# Patient Record
Sex: MALE | Race: OTHER | Employment: FULL TIME | URBAN - METROPOLITAN AREA
[De-identification: names, ages, dates, MRNs, and addresses within clinical notes are randomized per-mention and may not be internally consistent; named-entity substitution may affect disease eponyms.]

---

## 2021-04-27 ENCOUNTER — OFFICE VISIT (OUTPATIENT)
Dept: FAMILY MEDICINE CLINIC | Age: 31
End: 2021-04-27
Payer: MEDICAID

## 2021-04-27 VITALS
BODY MASS INDEX: 33.63 KG/M2 | HEIGHT: 64 IN | DIASTOLIC BLOOD PRESSURE: 58 MMHG | RESPIRATION RATE: 18 BRPM | WEIGHT: 197 LBS | OXYGEN SATURATION: 97 % | TEMPERATURE: 96.9 F | HEART RATE: 84 BPM | SYSTOLIC BLOOD PRESSURE: 118 MMHG

## 2021-04-27 DIAGNOSIS — Z83.3 FAMILY HISTORY OF DIABETES MELLITUS: ICD-10-CM

## 2021-04-27 DIAGNOSIS — Z11.4 SCREENING FOR HIV (HUMAN IMMUNODEFICIENCY VIRUS): ICD-10-CM

## 2021-04-27 DIAGNOSIS — Z13.1 SCREENING FOR DIABETES MELLITUS: ICD-10-CM

## 2021-04-27 DIAGNOSIS — Z13.220 SCREENING CHOLESTEROL LEVEL: ICD-10-CM

## 2021-04-27 DIAGNOSIS — H54.7 VISUAL IMPAIRMENT OF LEFT EYE: ICD-10-CM

## 2021-04-27 DIAGNOSIS — Z23 NEED FOR VACCINATION: ICD-10-CM

## 2021-04-27 DIAGNOSIS — Z00.01 ENCOUNTER FOR WELL ADULT EXAM WITH ABNORMAL FINDINGS: Primary | ICD-10-CM

## 2021-04-27 DIAGNOSIS — E66.9 CLASS 1 OBESITY WITHOUT SERIOUS COMORBIDITY WITH BODY MASS INDEX (BMI) OF 33.0 TO 33.9 IN ADULT, UNSPECIFIED OBESITY TYPE: ICD-10-CM

## 2021-04-27 DIAGNOSIS — Z11.59 NEED FOR HEPATITIS C SCREENING TEST: ICD-10-CM

## 2021-04-27 PROCEDURE — 90471 IMMUNIZATION ADMIN: CPT | Performed by: FAMILY MEDICINE

## 2021-04-27 PROCEDURE — 90715 TDAP VACCINE 7 YRS/> IM: CPT | Performed by: FAMILY MEDICINE

## 2021-04-27 PROCEDURE — 99385 PREV VISIT NEW AGE 18-39: CPT | Performed by: FAMILY MEDICINE

## 2021-04-27 SDOH — ECONOMIC STABILITY: TRANSPORTATION INSECURITY
IN THE PAST 12 MONTHS, HAS LACK OF TRANSPORTATION KEPT YOU FROM MEETINGS, WORK, OR FROM GETTING THINGS NEEDED FOR DAILY LIVING?: NO

## 2021-04-27 SDOH — ECONOMIC STABILITY: FOOD INSECURITY: WITHIN THE PAST 12 MONTHS, YOU WORRIED THAT YOUR FOOD WOULD RUN OUT BEFORE YOU GOT MONEY TO BUY MORE.: NEVER TRUE

## 2021-04-27 SDOH — HEALTH STABILITY: MENTAL HEALTH: HOW OFTEN DO YOU HAVE A DRINK CONTAINING ALCOHOL?: NEVER

## 2021-04-27 SDOH — ECONOMIC STABILITY: FOOD INSECURITY: WITHIN THE PAST 12 MONTHS, THE FOOD YOU BOUGHT JUST DIDN'T LAST AND YOU DIDN'T HAVE MONEY TO GET MORE.: NEVER TRUE

## 2021-04-27 ASSESSMENT — ENCOUNTER SYMPTOMS
COUGH: 0
EYE DISCHARGE: 0
ABDOMINAL PAIN: 0
ANAL BLEEDING: 0
DIARRHEA: 0
RHINORRHEA: 0
ABDOMINAL DISTENTION: 0
CHEST TIGHTNESS: 0
SHORTNESS OF BREATH: 0
COLOR CHANGE: 0
BLOOD IN STOOL: 0
SORE THROAT: 0
VOMITING: 0
NAUSEA: 0
CONSTIPATION: 0
WHEEZING: 0
APNEA: 0
SINUS PRESSURE: 0
CHOKING: 0
EYE PAIN: 0
PHOTOPHOBIA: 0

## 2021-04-27 ASSESSMENT — PATIENT HEALTH QUESTIONNAIRE - PHQ9
1. LITTLE INTEREST OR PLEASURE IN DOING THINGS: 0
SUM OF ALL RESPONSES TO PHQ QUESTIONS 1-9: 0
SUM OF ALL RESPONSES TO PHQ9 QUESTIONS 1 & 2: 0
SUM OF ALL RESPONSES TO PHQ QUESTIONS 1-9: 0

## 2021-04-27 NOTE — PROGRESS NOTES
oropharyngeal exudate. Eyes:      General: Lids are normal.         Right eye: No discharge. Left eye: No discharge. Conjunctiva/sclera: Conjunctivae normal.      Pupils:      Right eye: Pupil is round and reactive. Left eye: Pupil is not reactive. Pupil is round. Neck:      Musculoskeletal: Normal range of motion and neck supple. Thyroid: No thyromegaly. Vascular: No carotid bruit. Cardiovascular:      Rate and Rhythm: Normal rate and regular rhythm. Heart sounds: Normal heart sounds, S1 normal and S2 normal. No murmur. No friction rub. No gallop. Pulmonary:      Effort: Pulmonary effort is normal. No accessory muscle usage or respiratory distress. Breath sounds: Normal breath sounds. No wheezing, rhonchi or rales. Chest:      Chest wall: No tenderness. Abdominal:      General: Bowel sounds are normal. There is no distension. Palpations: Abdomen is soft. There is no mass. Tenderness: There is no abdominal tenderness. There is no right CVA tenderness, left CVA tenderness, guarding or rebound. Musculoskeletal: Normal range of motion. General: No tenderness or deformity. Lymphadenopathy:      Cervical: No cervical adenopathy. Upper Body:      Right upper body: No supraclavicular adenopathy. Left upper body: No supraclavicular adenopathy. Skin:     General: Skin is warm. Findings: No rash. Nails: There is no clubbing. Neurological:      Mental Status: He is alert and oriented to person, place, and time. Cranial Nerves: No cranial nerve deficit. Sensory: No sensory deficit. Motor: No abnormal muscle tone. Psychiatric:         Mood and Affect: Mood normal.         Behavior: Behavior normal.         Ortho Exam (If Applicable)              An electronic signature was used to authenticate this note.      Priscilla Sepulveda MD

## 2021-06-02 ENCOUNTER — OFFICE VISIT (OUTPATIENT)
Dept: FAMILY MEDICINE CLINIC | Age: 31
End: 2021-06-02
Payer: MEDICAID

## 2021-06-02 VITALS
BODY MASS INDEX: 32.44 KG/M2 | HEART RATE: 71 BPM | DIASTOLIC BLOOD PRESSURE: 80 MMHG | TEMPERATURE: 98.1 F | WEIGHT: 190 LBS | HEIGHT: 64 IN | SYSTOLIC BLOOD PRESSURE: 110 MMHG | OXYGEN SATURATION: 97 %

## 2021-06-02 DIAGNOSIS — J06.9 URI WITH COUGH AND CONGESTION: Primary | ICD-10-CM

## 2021-06-02 PROCEDURE — G8427 DOCREV CUR MEDS BY ELIG CLIN: HCPCS | Performed by: NURSE PRACTITIONER

## 2021-06-02 PROCEDURE — 87880 STREP A ASSAY W/OPTIC: CPT | Performed by: NURSE PRACTITIONER

## 2021-06-02 PROCEDURE — 99213 OFFICE O/P EST LOW 20 MIN: CPT | Performed by: NURSE PRACTITIONER

## 2021-06-02 PROCEDURE — G8417 CALC BMI ABV UP PARAM F/U: HCPCS | Performed by: NURSE PRACTITIONER

## 2021-06-02 PROCEDURE — 1036F TOBACCO NON-USER: CPT | Performed by: NURSE PRACTITIONER

## 2021-06-02 RX ORDER — AZITHROMYCIN 250 MG/1
TABLET, FILM COATED ORAL
Qty: 6 TABLET | Refills: 0 | Status: SHIPPED | OUTPATIENT
Start: 2021-06-02 | End: 2021-06-12

## 2021-06-02 RX ORDER — AZELASTINE 1 MG/ML
1 SPRAY, METERED NASAL 2 TIMES DAILY
Qty: 1 BOTTLE | Refills: 0 | Status: SHIPPED | OUTPATIENT
Start: 2021-06-02

## 2021-06-02 RX ORDER — BROMPHENIRAMINE MALEATE, PSEUDOEPHEDRINE HYDROCHLORIDE, AND DEXTROMETHORPHAN HYDROBROMIDE 2; 30; 10 MG/5ML; MG/5ML; MG/5ML
5 SYRUP ORAL 4 TIMES DAILY PRN
Qty: 180 ML | Refills: 0 | Status: SHIPPED | OUTPATIENT
Start: 2021-06-02

## 2021-06-02 ASSESSMENT — ENCOUNTER SYMPTOMS
WHEEZING: 0
VOMITING: 0
SINUS PAIN: 0
CONSTIPATION: 0
COLOR CHANGE: 0
ABDOMINAL DISTENTION: 0
HEARTBURN: 0
RHINORRHEA: 1
CHEST TIGHTNESS: 0
HEMOPTYSIS: 0
BACK PAIN: 0
SHORTNESS OF BREATH: 0
ABDOMINAL PAIN: 0
COUGH: 1
SINUS PRESSURE: 0
TROUBLE SWALLOWING: 0
SORE THROAT: 1
DIARRHEA: 0
NAUSEA: 0

## 2021-06-02 NOTE — LETTER
[address]  Phone: [phone]  Fax: [phone] Elva Sanches, APRN - [address] [facility]        June 2, 2021     Patient: Ja Salas   YOB: 1990   Date of Visit: 6/2/2021       To Whom It May Concern: It is my medical opinion that Gavin Spears may return to work on 6/4/2021. If you have any questions or concerns, please don't hesitate to call.     Sincerely,        AIDE Cordova - CNP

## 2021-06-02 NOTE — PROGRESS NOTES
Subjective  Payal Challenger, 26 y.o. male presents today with:  Chief Complaint   Patient presents with    Congestion     Pt states that 2days ago he started gettin stuffy noes and sore throat ole with a cough . Pt denies being exposed to covid. Cough  This is a new problem. The current episode started in the past 7 days (3 days). The problem has been gradually worsening. The problem occurs every few minutes. The cough is productive of sputum. Associated symptoms include chills, nasal congestion, postnasal drip, rhinorrhea and a sore throat. Pertinent negatives include no chest pain, ear congestion, ear pain, fever, headaches, heartburn, hemoptysis, myalgias, rash, shortness of breath, sweats, weight loss or wheezing. Nothing aggravates the symptoms. Treatments tried: dayquil. The treatment provided mild relief. There is no history of asthma, bronchiectasis, bronchitis, COPD, emphysema, environmental allergies or pneumonia. Review of Systems   Constitutional: Positive for chills. Negative for activity change, appetite change, diaphoresis, fatigue, fever and weight loss. HENT: Positive for congestion, postnasal drip, rhinorrhea and sore throat. Negative for ear pain, sinus pressure, sinus pain and trouble swallowing. Eyes: Negative for visual disturbance. Respiratory: Positive for cough. Negative for hemoptysis, chest tightness, shortness of breath and wheezing. Cardiovascular: Negative for chest pain and palpitations. Gastrointestinal: Negative for abdominal distention, abdominal pain, constipation, diarrhea, heartburn, nausea and vomiting. Genitourinary: Negative for difficulty urinating, dysuria, flank pain and frequency. Musculoskeletal: Negative for arthralgias, back pain, myalgias, neck pain and neck stiffness. Skin: Negative for color change and rash. Allergic/Immunologic: Negative for environmental allergies.    Neurological: Negative for dizziness, tremors, seizures, Disease Father     Alcohol Abuse Father     Asthma Sister     Allergy (Severe) Sister     Diabetes Maternal Aunt     Diabetes Maternal Grandfather      No Known Allergies  Current Outpatient Medications   Medication Sig Dispense Refill    azithromycin (ZITHROMAX) 250 MG tablet Take 2 tabs DAY 1 then 1 tab DAYS 2-5 6 tablet 0    brompheniramine-pseudoephedrine-DM 2-30-10 MG/5ML syrup Take 5 mLs by mouth 4 times daily as needed for Congestion or Cough 180 mL 0    azelastine (ASTELIN) 0.1 % nasal spray 1 spray by Nasal route 2 times daily Use in each nostril as directed 1 Bottle 0     No current facility-administered medications for this visit. PMH, Surgical Hx, Family Hx, and Social Hx reviewed and updated. Health Maintenance reviewed. Objective    Vitals:    06/02/21 1447   BP: 110/80   Site: Right Upper Arm   Position: Sitting   Cuff Size: Medium Adult   Pulse: 71   Temp: 98.1 °F (36.7 °C)   TempSrc: Oral   SpO2: 97%   Weight: 190 lb (86.2 kg)   Height: 5' 4\" (1.626 m)       Physical Exam  Constitutional:       General: He is not in acute distress. Appearance: Normal appearance. He is normal weight. He is not ill-appearing, toxic-appearing or diaphoretic. HENT:      Head: Normocephalic and atraumatic. Right Ear: Hearing, ear canal and external ear normal. Tympanic membrane is bulging. Tympanic membrane is not erythematous. Left Ear: Hearing, ear canal and external ear normal. Tympanic membrane is bulging. Tympanic membrane is not erythematous. Nose: Mucosal edema, congestion and rhinorrhea present. Rhinorrhea is clear. Mouth/Throat:      Lips: Pink. Mouth: Mucous membranes are moist.      Pharynx: Oropharynx is clear. Uvula midline. Posterior oropharyngeal erythema present. No pharyngeal swelling, oropharyngeal exudate or uvula swelling. Eyes:      General:         Right eye: No discharge. Left eye: No discharge.       Conjunctiva/sclera: Conjunctivae normal. Pupils: Pupils are equal, round, and reactive to light. Cardiovascular:      Rate and Rhythm: Normal rate and regular rhythm. Pulses: Normal pulses. Pulmonary:      Effort: Pulmonary effort is normal.      Breath sounds: Normal breath sounds. Abdominal:      General: There is no distension. Palpations: Abdomen is soft. Tenderness: There is no abdominal tenderness. Musculoskeletal:         General: No tenderness or signs of injury. Normal range of motion. Cervical back: Normal range of motion and neck supple. No rigidity or tenderness. Skin:     General: Skin is warm and dry. Capillary Refill: Capillary refill takes less than 2 seconds. Neurological:      General: No focal deficit present. Mental Status: He is alert and oriented to person, place, and time. Mental status is at baseline. Cranial Nerves: No cranial nerve deficit. Sensory: No sensory deficit. Motor: No weakness. Coordination: Coordination normal.         Assessment & Plan    Diagnosis Orders   1. URI with cough and congestion  POCT rapid strep A    azithromycin (ZITHROMAX) 250 MG tablet    brompheniramine-pseudoephedrine-DM 2-30-10 MG/5ML syrup    azelastine (ASTELIN) 0.1 % nasal spray     Orders Placed This Encounter   Procedures    POCT rapid strep A     Orders Placed This Encounter   Medications    azithromycin (ZITHROMAX) 250 MG tablet     Sig: Take 2 tabs DAY 1 then 1 tab DAYS 2-5     Dispense:  6 tablet     Refill:  0    brompheniramine-pseudoephedrine-DM 2-30-10 MG/5ML syrup     Sig: Take 5 mLs by mouth 4 times daily as needed for Congestion or Cough     Dispense:  180 mL     Refill:  0    azelastine (ASTELIN) 0.1 % nasal spray     Si spray by Nasal route 2 times daily Use in each nostril as directed     Dispense:  1 Bottle     Refill:  0     There are no discontinued medications.    Return in about 1 week (around 2021), or if symptoms worsen or fail to improve, for follow up with PCP. Reviewed with the patient: current clinical status,medications, activities and diet. Side effects, adverse effects of themedication prescribed today, as well as treatment plan/ rationale and result expectations have been discussed with the patient who expresses understanding and desires to proceed. Close follow up to evaluate treatment results and for coordination of care. I have reviewed the patient's medical history in detail and updated the computerized patient record.      Ileana Huggins, AIDE - CNP

## 2021-06-02 NOTE — PATIENT INSTRUCTIONS
Patient Education        Infección de las vías respiratorias altas (Radha Alex): Instrucciones de cuidado  Upper Respiratory Infection (Cold): Care Instructions  Instrucciones de cuidado     La infección de las vías respiratorias altas (o URI, por kerry siglas en inglés), es crystal infección de la Sergei, los senos paranasales o la garganta. Las URI se transmiten por la tos, los estornudos y el contacto directo. El resfriado común es el tipo más frecuente de URI. La gripe y las infecciones de los senos paranasales son otros tipos de URI. Veronica todas las URI son causadas por virus. Los antibióticos no las Lana [place]. Sin embargo, usted puede tratar la mayoría de estas infecciones con cuidados en el [facility]. [place] puede implicar beber muchos líquidos y brianna analgésicos (medicamentos para el dolor) de venta saba. Es probable que se sienta mejor al cabo de 4 a 10 días. El médico lo batres revisado minuciosamente, ishmael se pueden presentar problemas más tarde. Si nota algún problema o nuevos síntomas, busque tratamiento médico inmediatamente. La atención de seguimiento es crystal parte clave de quevedo tratamiento y seguridad. Asegúrese de hacer y acudir a todas las citas, y llame a quevedo médico si está teniendo problemas. También es crystal buena idea saber los resultados de kerry exámenes y mantener crystal lista de los medicamentos que laura. ¿Cómo puede cuidarse en el [facility]? · Brooksie Blotter líquido para prevenir la deshidratación. Opte por beber agua y otros líquidos césar sin cafeína hasta que se sienta mejor. Si tiene crystal enfermedad renal, cardíaca o hepática y tiene que restringir los líquidos, hable con quevedo médico antes de aumentar la cantidad de líquido que linda. · [place] un analgésico de venta saba, sheri acetaminofén (Tylenol), ibuprofeno (Advil, Motrin) o naproxeno (Aleve). Gabby y siga todas las instrucciones de la [place]. · Antes de usar medicamentos para la tos y los resfriados, revise la [place].  Estos medicamentos podrían no ser seguros para los niños [address] [place] con ciertos problemas de [place]. · Tenga cuidado cuando tome medicamentos de venta saba para el resfriado común o la gripe y Tylenol al [employer]. Muchos de estos medicamentos contienen acetaminofén, o sea, Tylenol. Gabby las etiquetas para asegurarse de que no está tomando crystal dosis mayor que la recomendada. El exceso de acetaminofén (Tylenol) puede ser dañino. · Descanse lo suficiente. · No fume ni permita que otros fumen cerca de usted. Si necesita ayuda para dejar de fumar, hable con beebe médico acerca de programas y medicamentos para dejar de fumar. Estos pueden aumentar kerry probabilidades de dejar el hábito para siempre. ¿Cuándo debe pedir ayuda? Llame al 911 en cualquier momento que considere que necesita atención de [place]. Por ejemplo, llame si:    · Tiene graves dificultades para respirar. Llame a beebe médico ahora mismo o busque atención médica inmediata si:    · Le parece que está mucho más enfermo.     · Tiene nueva o peor dificultad para respirar.     · Tiene fiebre nueva o más kolton.     · Tiene un salpullido nuevo. Preste especial atención a los cambios en beebe vicente y asegúrese de comunicarse con beebe médico si:    · Tiene síntomas nuevos, sheri dolor de garganta, dolor de oídos o dolor de los senos paranasales.     · Beebe tos es más profunda o más frecuente que antes, especialmente si nota más mucosidad o un cambio en el color de la mucosidad.     · No mejora sheri se esperaba. ¿Dónde puede encontrar más información en inglés? Robb Ramsay a [email or web address] e ingrese a beebe cuenta de MyChart. Layla Schultz [address] en el cuadro \"Search Health Information\" para más información (en inglés) sobre \"Infección de las vías respiratorias altas (Iona Blade): Instrucciones de cuidado. \"     Si no tiene crystal cuenta, sigifredo hebert en el enlace \"Sign Up Now\". Revisado: 26 octubre, 2020               Versión del contenido: 12.8  © 2070-2089 Healthwise, Incorporated.    Wilder Garcia instrucciones de cuidado fueron adaptadas bajo licencia por [facility] ([facility]). Si usted tiene [place] afección médica o sobre estas instrucciones, siempre pregunte a quevedo profesional de vicente. [facility] niega toda garantía o responsabilidad por quevedo uso de esta información.

## 2021-08-04 ENCOUNTER — OFFICE VISIT (OUTPATIENT)
Dept: FAMILY MEDICINE CLINIC | Age: 31
End: 2021-08-04
Payer: COMMERCIAL

## 2021-08-04 ENCOUNTER — HOSPITAL ENCOUNTER (OUTPATIENT)
Age: 31
Setting detail: SPECIMEN
Discharge: HOME | End: 2021-08-04
Payer: COMMERCIAL

## 2021-08-04 VITALS
HEIGHT: 64 IN | WEIGHT: 194.6 LBS | OXYGEN SATURATION: 97 % | DIASTOLIC BLOOD PRESSURE: 72 MMHG | TEMPERATURE: 98.1 F | SYSTOLIC BLOOD PRESSURE: 120 MMHG | BODY MASS INDEX: 33.22 KG/M2 | HEART RATE: 84 BPM

## 2021-08-04 DIAGNOSIS — R50.81 FEVER IN OTHER DISEASES: Primary | ICD-10-CM

## 2021-08-04 DIAGNOSIS — J06.9 VIRAL URI: ICD-10-CM

## 2021-08-04 PROCEDURE — 87635 SARS-COV-2 COVID-19 AMP PRB: CPT

## 2021-08-04 PROCEDURE — 87804 INFLUENZA ASSAY W/OPTIC: CPT | Performed by: NURSE PRACTITIONER

## 2021-08-04 PROCEDURE — 99213 OFFICE O/P EST LOW 20 MIN: CPT | Performed by: NURSE PRACTITIONER

## 2021-08-04 ASSESSMENT — ENCOUNTER SYMPTOMS
EYE REDNESS: 0
SINUS PAIN: 0
EYE ITCHING: 0
SINUS PRESSURE: 0
ABDOMINAL PAIN: 0
COUGH: 0
VOMITING: 0
DIARRHEA: 0
CHEST TIGHTNESS: 0
EYE DISCHARGE: 0
WHEEZING: 0
RHINORRHEA: 1
SORE THROAT: 1
VOICE CHANGE: 0
SHORTNESS OF BREATH: 0
FACIAL SWELLING: 0
TROUBLE SWALLOWING: 0
COLOR CHANGE: 0
EYE PAIN: 0
NAUSEA: 0

## 2021-08-04 NOTE — PROGRESS NOTES
Subjective:      Patient ID: Evaristo Fox is a 26 y.o. male who presents today for:  Chief Complaint   Patient presents with    Headache    Pharyngitis    Generalized Body Aches    Fever       HPI  Patient is here with c/o sore throat, muscle aches, and HA for the last 2 days. Denies cough or chest congestion. Fever 100 for the last day. No fever at this time. He is taking Advil, last dose prior to coming in for visit. Reports some mild nasal drainage and congestion. Reports normal sense of taste and smell. Denies GI sx and says appetite is good. He has Hx allergies but has not been using the nasal spray. He has not had Covid and has not been vaccinated. Past Medical History:   Diagnosis Date    Allergic rhinitis     Headache     Obesity 4/27/2021     Past Surgical History:   Procedure Laterality Date    EYE SURGERY  2004    Lense put in lt eye      Social History     Socioeconomic History    Marital status:      Spouse name: Marco Oconnell Number of children: 3    Years of education: 15    Highest education level: Some college, no degree   Occupational History    Occupation: [employer]   Tobacco Use    Smoking status: Never Smoker    Smokeless tobacco: Never Used   Vaping Use    Vaping Use: Never used   Substance and Sexual Activity    Alcohol use: Never    Drug use: Never    Sexual activity: Yes   Other Topics Concern    Not on file   Social History Narrative    Not on file     Social Determinants of Health     Financial Resource Strain: Low Risk     Difficulty of Paying Living Expenses: Not hard at all   Food Insecurity: No Food Insecurity    Worried About Running Out of Food in the Last Year: Never true    [address] in the Last Year: Never true   Transportation Needs: No Transportation Needs    Lack of Transportation (Medical): No    Lack of Transportation (Non-Medical):  No   Physical Activity:     Days of Exercise per Week:     Minutes of Exercise per Session:    Stress:     Feeling of Stress :    Social Connections:     Frequency of Communication with Friends and Family:     Frequency of Social Gatherings with Friends and Family:     Attends Sikh Services:     Active Member of Clubs or Organizations:     Attends Club or Organization Meetings:     Marital Status:    Intimate Partner Violence:     Fear of Current or Ex-Partner:     Emotionally Abused:     Physically Abused:     Sexually Abused:      Family History   Problem Relation Age of Onset    Depression Mother     High Blood Pressure Father     Heart Disease Father     Alcohol Abuse Father     Asthma Sister     Allergy (Severe) Sister     Diabetes Maternal Aunt     Diabetes Maternal Grandfather      No Known Allergies  Current Outpatient Medications   Medication Sig Dispense Refill    brompheniramine-pseudoephedrine-DM 2-30-10 MG/5ML syrup Take 5 mLs by mouth 4 times daily as needed for Congestion or Cough 180 mL 0    azelastine (ASTELIN) 0.1 % nasal spray 1 spray by Nasal route 2 times daily Use in each nostril as directed 1 Bottle 0     No current facility-administered medications for this visit. Review of Systems   Constitutional: Positive for fever. Negative for activity change, appetite change, chills, diaphoresis, fatigue and unexpected weight change. HENT: Positive for congestion, postnasal drip, rhinorrhea and sore throat. Negative for ear discharge, ear pain, facial swelling, sinus pressure, sinus pain, sneezing, tinnitus, trouble swallowing and voice change. Eyes: Negative for pain, discharge, redness and itching. Respiratory: Negative for cough, chest tightness, shortness of breath and wheezing. Cardiovascular: Negative for chest pain. Gastrointestinal: Negative for abdominal pain, diarrhea, nausea and vomiting. Musculoskeletal: Negative for arthralgias and myalgias. Skin: Negative for color change and rash.    Allergic/Immunologic: Positive for environmental allergies. Neurological: Negative for dizziness, weakness, light-headedness and headaches. Hematological: Negative for adenopathy. Objective:   /72 (Site: Right Upper Arm, Position: Sitting, Cuff Size: Medium Adult)   Pulse 84   Temp 98.1 °F (36.7 °C) (Temporal)   Ht 5' 4\" (1.626 m)   Wt 194 lb 9.6 oz (88.3 kg)   SpO2 97%   BMI 33.40 kg/m²     Physical Exam  Vitals reviewed. Constitutional:       General: He is awake. He is not in acute distress. Appearance: Normal appearance. He is well-developed and well-groomed. He is obese. He is not ill-appearing, toxic-appearing or diaphoretic. HENT:      Head: Normocephalic and atraumatic. Right Ear: Hearing, tympanic membrane, ear canal and external ear normal.      Left Ear: Hearing, tympanic membrane, ear canal and external ear normal.      Nose: Congestion and rhinorrhea present. Rhinorrhea is clear. Right Turbinates: Swollen. Left Turbinates: Swollen. Right Sinus: No maxillary sinus tenderness or frontal sinus tenderness. Left Sinus: No maxillary sinus tenderness or frontal sinus tenderness. Mouth/Throat:      Lips: Pink. Mouth: Mucous membranes are dry. Pharynx: Oropharynx is clear. Uvula midline. No pharyngeal swelling, oropharyngeal exudate, posterior oropharyngeal erythema or uvula swelling. Tonsils: No tonsillar exudate or tonsillar abscesses. 0 on the right. 0 on the left. Eyes:      General: Lids are normal.      Extraocular Movements: Extraocular movements intact. Conjunctiva/sclera: Conjunctivae normal.   Neck:      Trachea: Trachea normal.   Cardiovascular:      Rate and Rhythm: Normal rate and regular rhythm. Pulses: Normal pulses. Heart sounds: Normal heart sounds, S1 normal and S2 normal.   Pulmonary:      Effort: Pulmonary effort is normal.      Breath sounds: Normal breath sounds and air entry.    Musculoskeletal:         General: Normal range of motion. Cervical back: Full passive range of motion without pain and neck supple. Lymphadenopathy:      Cervical: No cervical adenopathy. Skin:     General: Skin is warm and dry. Capillary Refill: Capillary refill takes less than 2 seconds. Neurological:      General: No focal deficit present. Mental Status: He is alert and oriented to person, place, and time. Mental status is at baseline. Psychiatric:         Attention and Perception: Attention and perception normal.         Mood and Affect: Mood and affect normal.         Speech: Speech normal.         Behavior: Behavior normal. Behavior is cooperative. Thought Content: Thought content normal.         Cognition and Memory: Cognition and memory normal.         Judgment: Judgment normal.         Assessment:       Diagnosis Orders   1. Fever in other diseases  COVID-19    POCT Influenza A/B   2. Viral URI       No results found for this visit on 08/04/21. Plan:     Assessment & Plan   Nik was seen today for headache, pharyngitis, generalized body aches and fever. Diagnoses and all orders for this visit:    Fever in other diseases  -     COVID-19; Future  -     POCT Influenza A/B    Viral URI  Discussed with patient illness appearing to be viral given sx and exam findings. Discussed sore throat viral sx does not appear to be strep. Will check for flu and Covid 19- and call with results. Advised to remain in quarantine until Covid testing is back. Advised rest, fluids, and can cont OTC meds for symptoms. Advised with ER with any worsening symptoms. May call office with any questions or concerns. Orders Placed This Encounter   Procedures    COVID-19     Standing Status:   Future     Standing Expiration Date:   8/4/2022     Scheduling Instructions:      1) Due to current limited availability of the COVID-19 test, tests will be prioritized based on responses to questions above. Testing may be delayed due to volume. 2) Print and instruct patient to adhere to CDC home isolation program. (Link Above)              3) Set up or refer patient for a monitoring program.              4) Have patient sign up for and leverage MyChart (if not previously done). Order Specific Question:   Is this test for diagnosis or screening? Answer:   Diagnosis of ill patient     Order Specific Question:   Symptomatic for COVID-19 as defined by CDC? Answer:   Yes     Order Specific Question:   Date of Symptom Onset     Answer:   8/2/2021     Order Specific Question:   Hospitalized for COVID-19? Answer:   No     Order Specific Question:   Admitted to ICU for COVID-19? Answer:   No     Order Specific Question:   Employed in healthcare setting? Answer:   No     Order Specific Question:   Resident in a congregate (group) care setting? Answer:   No     Order Specific Question:   Pregnant: Answer:   No     Order Specific Question:   Previously tested for COVID-19? Answer: Yes    POCT Influenza A/B     No orders of the defined types were placed in this encounter. There are no discontinued medications. No follow-ups on file. Reviewed with the patient/family: current clinical status & medications. Side effects of the medication prescribed today, as well as treatment plan/rationale and result expectations have been discussed with the patient/family who expresses understanding. Patient will be discharged home in stable condition. Follow up with PCP to evaluate treatment results or return if symptoms worsen or fail to improve. Discussed signs and symptoms which require immediate follow-up in ED/call to 911. Understanding verbalized. I have reviewed the patient's medical history in detail and updated the computerized patient record.     Travis Corey, APRN - CNP

## 2021-08-04 NOTE — LETTER
NOTIFICATION RETURN TO WORK / SCHOOL    8/4/2021    Mr. Tarsha Whitt  [address] [facility] [id number]      To Whom It May Concern:    Tarsha Whitt was tested for COVID-19 on 8/4, and testing is pending. He may return to work when [place] testing is resulted, generally takes 24-48 hours. .  I recommend: return to work only if Covid testing is negative. If there are questions or concerns, please have the patient contact our office.         Sincerely,      AIDE Thorne - CNP

## 2021-08-04 NOTE — PATIENT INSTRUCTIONS
Patient Education        Concha Ing: Instrucciones de cuidado  Fever: Care Instructions  Generalidades     La fiebre es temperatura corporal kolton. Es crystal de las maneras que tiene el organismo de combatir las enfermedades. Crystal temperatura de hasta 102°F (38.8°C) puede ser Phyllis, porque ayuda al cuerpo a responder a las infecciones. La mayoría de las personas saludables pueden tener fiebre de hasta entre 103°F (39.4°C) y 104°F (40°C) por breves períodos de tiempo sin problemas. En la [employer], la fiebre significa que tiene crystal enfermedad leve. La atención de seguimiento es crystal parte clave de quevedo tratamiento y seguridad. Asegúrese de hacer y acudir a todas las citas, y llame a quevedo médico si está teniendo problemas. También es crystal buena idea saber los resultados de krery exámenes y mantener crystal lista de los medicamentos que laura. ¿Cómo puede cuidarse en el hogar? · Orvil Noon líquido para prevenir la deshidratación. Opte por agua y otros líquidos césar sin cafeína. Si tiene que [employer] líquidos por un problema de vicente, hable con quevedo médico antes de aumentar la cantidad de líquido que linda. · [place] un medicamento de venta saba, sheri acetaminofén (Tylenol), ibuprofeno (Advil, Motrin) o naproxeno (Aleve), para [facility]. Gabby y siga todas las instrucciones de la [place]. Ninguna persona ever de 20 años debe brianna aspirina. Se la ha relacionado con el síndrome de Reye, crystal enfermedad grave. · Manuel reposo y limite la [place]. · Use ropa Narvis Ligas. · Coma alimentos suaves, sheri sopa. ¿Cuándo debe pedir ayuda? Llame a quevedo médico ahora mismo o busque atención médica inmediata si:    · La fiebre es de 104°F (40°C) o más.     · Tiene fiebre que se mantiene kolton.     · Tiene fiebre y se siente aturdido o mareado con frecuencia.     · Tiene dificultades para respirar.     · Tiene fiebre y rigidez en el alan o dolor de suzette intenso.    Preste especial atención a los [facility] y asegúrese de comunicarse con quevedo médico si:    · Tiene problemas con quevedo medicamento o le sube la fiebre david después de empezar con un medicamento nuevo.     · No mejora sheri se esperaba. ¿Dónde puede encontrar más información en inglés? [facility] a [email or web address] e ingrese a quevedo cuenta de MyChart. Cristian So F025 en el Ilcory Mary \"Search Health Information\" para más información (en inglés) sobre \"Fiebre: Instrucciones de cuidado. \"     Si no tiene crystal cuenta, sigifredo hebert en el enlace \"Sign Up Now\". Revisado: 19 octubre, 2020               Versión del contenido: 12.9  © 5728-3517 Healthwise, Incorporated. Las instrucciones de cuidado fueron adaptadas bajo licencia por [facility] ([facility]). Si usted tiene [place] afección médica o sobre estas instrucciones, siempre pregunte a quevedo profesional de vicente. Healthwise, Incorporated niega toda garantía o responsabilidad por quevedo uso de esta información. Patient Education        Infección viral respiratoria: Instrucciones de cuidado  Viral Respiratory Infection: Care Instructions  Instrucciones de cuidado     Los virus son organismos muy pequeños. Se multiplican después de ingresar en el cuerpo. Hay muchos tipos que causan [address] [facility], sheri los resfriados y las paperas. Los síntomas de crystal infección viral respiratoria a menudo comienzan rápidamente. Incluyen fiebre, dolor de garganta y goteo nasal. También es posible que simplemente no se sienta delicia. O podría no tener [employer]. La mayoría de las infecciones virales respiratorias no son graves. Suelen mejorarse con tiempo y cuidado personal.  Los antibióticos no se usan para tratar crystal infección viral. Oran es porque los antibióticos no ayudan a curar crystal enfermedad viral. En algunos casos, los medicamentos antivirales pueden ayudar a quevedo organismo a eliminar crystal infección viral grave. La atención de seguimiento es crystal parte clave de quevedo tratamiento y seguridad.  Asegúrese de hacer y acudir a todas las citas, y llame a quevedo médico si está teniendo problemas. También es crystal buena idea saber los resultados de kerry exámenes y mantener crystal lista de los medicamentos que laura. ¿Cómo puede cuidarse en el hogar? · Descanse lo más que pueda hasta que se sienta mejor. · Sea gamal con los medicamentos. [facility] medicamentos exactamente sheri le fueron recetados. Llame a quevedo médico si raimundo estar teniendo problemas con quevedo medicamento. Recibirá más detalles sobre el medicamento específico recetado por quevedo médico.  · [place] un analgésico (medicamento para el dolor) de venta saba, sheri acetaminofén (Tylenol), ibuprofeno (Advil, Motrin) o naproxeno (Aleve), cuando sea necesario para aliviar el dolor y la [place]. Gabby y siga todas las instrucciones de la [place]. No le dé aspirina a ninguna persona ever de 20 años. Bernabe sido relacionada con el síndrome de Reye, crystal enfermedad grave. · Gena mucho líquido. Los líquidos calientes, sheri té o sopa, pueden ayudar a aliviar la congestión en la nariz y la garganta. Si tiene crystal enfermedad renal, cardíaca o hepática y tiene que restringir los líquidos, hable con quevedo médico antes de aumentar la cantidad de líquido que linda. · Trate de sacar la mucosidad (flema) de los pulmones respirando profundamente y tosiendo. · Manuel gárgaras con agua salada tibia crystal vez por hora. [place] puede ayudar a disminuir la hinchazón y el dolor de garganta. Mezcle 1 cucharadita de sal en 1 taza de agua tibia. · No fume ni permita que otros lo torin cerca de usted. Si necesita ayuda para dejar de fumar, hable con quevedo médico sobre programas y medicamentos para dejar de fumar. Estos pueden aumentar kerry probabilidades de dejar el hábito para siempre. Para evitar propagar el virus  · Tosa o estornude en un pañuelo de papel y luego deséchelo. · Si no tiene un pañuelo de papel, cúbrase con la mano al toser o estornudar y [employer]. También puede toser [employer] manga de quevedo ropa.   · Lorsergio Lob las jone con frecuencia. Use agua tibia y Los. Láveselas de 15 a 20 segundos cada vez. · Si no tiene [place] y jabón a quevedo alcance, puede limpiarse las jone con toallitas con alcohol o un gel a base de alcohol. ¿Cuándo debe pedir ayuda? Llame a quevedo médico ahora mismo o busque atención médica inmediata si:    · Tiene fiebre nueva o que aumenta.     · La fiebre dura más de 48 horas.     · Tiene dificultades para respirar.     · Tiene fiebre junto con rigidez en el alan o dolor de suzette intenso.     · Está sensible a la aubrey.     · Se siente muy somnoliento (con sueño) o confuso. Preste especial atención a los cambios en quevedo vicente y asegúrese de comunicarse con quevedo médico si:    · No mejora sheri se esperaba. ¿Dónde puede encontrar más información en inglés? Bonnee Pleasure a [email or web address] e ingrese a quevedo cuenta de MyChart. Shayne Dumont H006 en el Eather Jaspreet \"Search Health Information\" para más información (en inglés) sobre \"Infección viral respiratoria: Instrucciones de cuidado. \"     Si no tiene crystal cuenta, sigifredo hebert en el enlace \"Sign Up Now\". Revisado: 26 octubre, 2020               Versión del contenido: 12.9  © 2855-1504 Healthwise, Incorporated. Las instrucciones de cuidado fueron adaptadas bajo licencia por [facility] CARE ([facility]). Si usted tiene [place] afección médica o sobre estas instrucciones, siempre pregunte a quevedo profesional de vicente. Healthwise, Incorporated niega toda garantía o responsabilidad por quevedo uso de esta información.        We'll call with COVID-19 results  Results will also be available on your [facility] account, if activated  Things to Know:   - Do not leave home except to get medical care while waiting for your results  - Testing does not change treatment--> there is no medication that treats COVID-19  - Drink plenty of fluids and rest as much as needed  - May take over the counter medicine such as ibuprofen (aka Motrin/ Advil) or acetaminophen (aka Tylenol) for pain or fever, follow directions on label  - Continually monitor symptoms + contact a medical provider if symptoms are worsening, such as difficulty breathing  - Avoid exposure to environmental irritants/ allergens where possible    - Practice social distancing and wear a face mask when leaving home is necessary  - Symptoms may develop 2 days to 2 weeks following exposure to the virus- if you are exposed after testing, or if you were in the incubation period when tested, you could become ill with COVID-19 later    Keep a low threshold to go to ER or call 9-1-1 for new or suddenly worsening symptoms --> trouble breathing, high fevers that are unresponsive to fever-reducing medications, difficulty staying awake, vomiting/ unable to keep food or fluids down, any other symptoms that you think could be an emergency.

## 2021-08-05 LAB — SARS-COV-2, PCR: NOT DETECTED

## 2021-09-29 ENCOUNTER — NURSE TRIAGE (OUTPATIENT)
Dept: OTHER | Facility: CLINIC | Age: 31
End: 2021-09-29

## 2021-09-29 ENCOUNTER — TELEPHONE (OUTPATIENT)
Dept: FAMILY MEDICINE CLINIC | Age: 31
End: 2021-09-29
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
## 2021-12-29 ENCOUNTER — HOSPITAL ENCOUNTER (EMERGENCY)
Age: 31
Discharge: HOME | End: 2021-12-29
Attending: EMERGENCY MEDICINE
Payer: COMMERCIAL

## 2021-12-29 VITALS
DIASTOLIC BLOOD PRESSURE: 104 MMHG | OXYGEN SATURATION: 98 % | HEART RATE: 92 BPM | TEMPERATURE: 98.9 F | BODY MASS INDEX: 31.93 KG/M2 | RESPIRATION RATE: 20 BRPM | WEIGHT: 186 LBS | SYSTOLIC BLOOD PRESSURE: 129 MMHG

## 2021-12-29 DIAGNOSIS — Z01.84 COVID-19 VIRUS IGG ANTIBODY TEST RESULT UNKNOWN: Primary | ICD-10-CM

## 2021-12-29 LAB
INFLUENZA A BY PCR: NEGATIVE
INFLUENZA B BY PCR: NEGATIVE

## 2021-12-29 PROCEDURE — 99282 EMERGENCY DEPT VISIT SF MDM: CPT

## 2021-12-29 PROCEDURE — U0005 INFEC AGEN DETEC AMPLI PROBE: HCPCS

## 2021-12-29 PROCEDURE — 87502 INFLUENZA DNA AMP PROBE: CPT

## 2021-12-29 PROCEDURE — U0003 INFECTIOUS AGENT DETECTION BY NUCLEIC ACID (DNA OR RNA); SEVERE ACUTE RESPIRATORY SYNDROME CORONAVIRUS 2 (SARS-COV-2) (CORONAVIRUS DISEASE [COVID-19]), AMPLIFIED PROBE TECHNIQUE, MAKING USE OF HIGH THROUGHPUT TECHNOLOGIES AS DESCRIBED BY CMS-2020-01-R: HCPCS

## 2021-12-29 ASSESSMENT — ENCOUNTER SYMPTOMS
COUGH: 1
VOMITING: 0
BACK PAIN: 0
ABDOMINAL PAIN: 0
RHINORRHEA: 1
EYE DISCHARGE: 0
SORE THROAT: 1
NAUSEA: 0
EYE REDNESS: 0
SHORTNESS OF BREATH: 0

## 2021-12-29 NOTE — ED TRIAGE NOTES
Complains of cough, fever and body aches for 4 days. Covid exposure 6 days ago. resp even and unlabored.

## 2021-12-30 ENCOUNTER — CARE COORDINATION (OUTPATIENT)
Dept: CARE COORDINATION | Age: 31
End: 2021-12-30

## 2021-12-30 NOTE — ED PROVIDER NOTES
[facility] ED  EMERGENCY DEPARTMENT ENCOUNTER      Pt Name: Mary Waller  MRN: [id number]  [place] 1990  Date of evaluation: 12/29/2021  Provider: Claudia Church DO        HISTORY OF PRESENT ILLNESS    Mary Waller is a 33 y.o. male per chart review has ah/o obestiy, allergic rhinits. He presents with covid symptoms. The history is provided by the patient. URI  Presenting symptoms: congestion, cough, fatigue, rhinorrhea and sore throat    Presenting symptoms: no ear pain and no fever    Severity:  Mild  Onset quality:  Sudden  Timing:  Constant  Progression:  Unchanged  Chronicity:  New  Relieved by:  Nothing  Worsened by:  Nothing  Associated symptoms: myalgias    Associated symptoms: no neck pain    Risk factors: no recent illness and no sick contacts             REVIEW OF SYSTEMS       Review of Systems   Constitutional: Positive for fatigue. Negative for chills and fever. HENT: Positive for congestion, rhinorrhea and sore throat. Negative for ear pain. Eyes: Negative for discharge and redness. Respiratory: Positive for cough. Negative for shortness of breath. Cardiovascular: Negative for chest pain and palpitations. Gastrointestinal: Negative for abdominal pain, nausea and vomiting. Genitourinary: Negative for difficulty urinating and dysuria. Musculoskeletal: Positive for myalgias. Negative for back pain and neck pain. Skin: Negative for rash and wound. Neurological: Negative for dizziness and syncope. Psychiatric/Behavioral: Negative for confusion. The patient is not nervous/anxious. All other systems reviewed and are negative. Except as noted above the remainder of the review of systems was reviewed and negative.        PAST MEDICAL HISTORY     Past Medical History:   Diagnosis Date    Allergic rhinitis     Headache     Obesity 4/27/2021         SURGICAL HISTORY       Past Surgical History:   Procedure Laterality Date    EYE SURGERY  2004    Lense put in lt eye          CURRENT MEDICATIONS       Previous Medications    AZELASTINE (ASTELIN) 0.1 % NASAL SPRAY    1 spray by Nasal route 2 times daily Use in each nostril as directed    BROMPHENIRAMINE-PSEUDOEPHEDRINE-DM 2-30-10 MG/5ML SYRUP    Take 5 mLs by mouth 4 times daily as needed for Congestion or Cough       ALLERGIES     Patient has no known allergies. FAMILY HISTORY       Family History   Problem Relation Age of Onset    Depression Mother     High Blood Pressure Father     Heart Disease Father     Alcohol Abuse Father     Asthma Sister     Allergy (Severe) Sister     Diabetes Maternal Aunt     Diabetes Maternal Grandfather           SOCIAL HISTORY       Social History     Socioeconomic History    Marital status:      Spouse name: Gómez Melchor    Number of children: 3    Years of education: 15    Highest education level: Some college, no degree   Occupational History    Occupation: [employer]   Tobacco Use    Smoking status: Never Smoker    Smokeless tobacco: Never Used   Vaping Use    Vaping Use: Never used   Substance and Sexual Activity    Alcohol use: Never    Drug use: Never    Sexual activity: Yes   Other Topics Concern    None   Social History Narrative    None     Social Determinants of Health     Financial Resource Strain: Low Risk     Difficulty of Paying Living Expenses: Not hard at all   Food Insecurity: No Food Insecurity    Worried About Running Out of Food in the Last Year: Never true    [address] in the Last Year: Never true   Transportation Needs: No Transportation Needs    Lack of Transportation (Medical): No    Lack of Transportation (Non-Medical):  No   Physical Activity:     Days of Exercise per Week: Not on file    Minutes of Exercise per Session: Not on file   Stress:     Feeling of Stress : Not on file   Social Connections:     Frequency of Communication with Friends and Family: Not on file    Frequency of Social Gatherings with Friends and Family: Not on file    Attends Restorationism Services: Not on file    Active Member of Clubs or Organizations: Not on file    Attends Club or Organization Meetings: Not on file    Marital Status: Not on file   Intimate Partner Violence:     Fear of Current or Ex-Partner: Not on file    Emotionally Abused: Not on file    Physically Abused: Not on file    Sexually Abused: Not on file   Housing Stability:     Unable to Pay for Housing in the Last Year: Not on file    Number of [place] in the Last Year: Not on file    Unstable Housing in the Last Year: Not on file         PHYSICAL EXAM       ED Triage Vitals [12/29/21 1800]   BP Temp Temp Source Pulse Resp SpO2 Height Weight   (!) 129/104 98.9 °F (37.2 °C) Temporal 92 20 94 % -- 186 lb (84.4 kg)       Physical Exam  Vitals and nursing note reviewed. Constitutional:       Appearance: Normal appearance. HENT:      Head: Normocephalic and atraumatic. Right Ear: Tympanic membrane normal.      Left Ear: Tympanic membrane normal.      Nose: Congestion and rhinorrhea present. Mouth/Throat:      Mouth: Mucous membranes are moist.      Pharynx: Oropharynx is clear. Eyes:      General: Lids are normal.      Extraocular Movements: Extraocular movements intact. Conjunctiva/sclera: Conjunctivae normal.      Pupils: Pupils are equal, round, and reactive to light. Cardiovascular:      Rate and Rhythm: Normal rate and regular rhythm. Pulses: Normal pulses. Heart sounds: Normal heart sounds. Pulmonary:      Effort: Pulmonary effort is normal.      Breath sounds: Normal breath sounds. Abdominal:      General: Abdomen is flat. Bowel sounds are normal.      Palpations: Abdomen is soft. Musculoskeletal:         General: Normal range of motion. Cervical back: Full passive range of motion without pain, normal range of motion and neck supple. Skin:     General: Skin is warm.       Capillary Refill: Capillary refill takes less than 2 seconds. Neurological:      General: No focal deficit present. Mental Status: He is alert and oriented to person, place, and time. Deep Tendon Reflexes: Reflexes are normal and symmetric. Psychiatric:         Attention and Perception: Attention and perception normal.         Mood and Affect: Mood normal.         Behavior: Behavior normal. Behavior is cooperative. LABS:  Labs Reviewed   RAPID INFLUENZA A/B ANTIGENS   COVID-19   COVID-19         MDM:   Vitals:    Vitals:    12/29/21 1800   BP: (!) 129/104   Pulse: 92   Resp: 20   Temp: 98.9 °F (37.2 °C)   TempSrc: Temporal   SpO2: 94%   Weight: 186 lb (84.4 kg)       MDM  Number of Diagnoses or Management Options  Diagnosis management comments: Patient presents with covid symptoms. He will be discharged. covid test was completed. He will follow up in 2 days with his primary care doctor. Heather Mathews DO     The lab results, radiology and test results were reviewed with the patient and family. The patient will follow up in 2 days with their primary care doctor. If their symptoms change or get worse they will return to the ER. CRITICAL CARE TIME   Total CriticalCare time was 0 minutes, excluding separately reportable procedures. There was a high probability of clinically significant/life threatening deterioration in the patient's condition which required my urgent intervention.         PROCEDURES:  Unlessotherwise noted below, none     Procedures      FINAL IMPRESSION      1. COVID-19 virus IgG antibody test result unknown          DISPOSITION/PLAN   DISPOSITION Decision To Discharge 12/29/2021 07:50:12 PM          HAN Art DO (electronically signed)  Attending Emergency Physician          Bree Clayton DO  12/29/21 1951

## 2021-12-30 NOTE — CARE COORDINATION
Patient contacted regarding COVID-19 risk and exposure. Discussed COVID-19 related testing which was pending at this time. Test results were pending. Patient informed of results, if available? No and Pending. Ambulatory Care Manager contacted the patient by telephone to perform post discharge assessment. Call within 2 business days of discharge: Yes. Verified name and  with  / patient as identifiers. Provided introduction to self, and explanation of the CTN/ACM role, and reason for call due to risk factors for infection and/or exposure to COVID-19. Symptoms reviewed with  / patient who verbalized the following symptoms: cough, no new symptoms, no worsening symptoms and Sore Throat. Due to new onset of symptoms encounter was not routed to provider for escalation. Discussed follow-up appointments. If no appointment was previously scheduled, appointment scheduling offered: No.  [facility] follow up appointment(s): No future appointments. Non-[facility] follow up appointment(s):     Non-face-to-face services provided:  Obtained and reviewed discharge summary and/or continuity of care documents     Advance Care Planning:   Does patient have an Advance Directive:  reviewed and current. Educated patient about risk for severe COVID-19 due to risk factors according to CDC guidelines. ACM reviewed discharge instructions, medical action plan and red flag symptoms with the  / patiet  who verbalized understanding. Discussed COVID vaccination status: No. Education provided on COVID-19 vaccination as appropriate. Discussed exposure protocols and quarantine with CDC Guidelines. Patient was given an opportunity to verbalize any questions and concerns and agrees to contact ACM or health care provider for questions related to their healthcare. Reviewed and educated patient on any new and changed medications related to discharge diagnosis     Was patient discharged with a pulse oximeter?  No Discussed and confirmed pulse oximeter discharge instructions and when to notify provider or seek emergency care. AC provided contact information. Plan for follow-up call in 3-5 days based on severity of symptoms and risk factors. I spoke with Nik via [place]    He is feeling slightly better  He continues to have cough and sore throat. He feels that he has a good understanding of COVID symptoms and protocols  I have asked him to rest, drink, plenty of liquids, take all medications as prescribed, and monitor symptoms closely. I have also made him aware of the symptoms that require returning to the ER for evaluation. He verbalizes understanding of the information discussed.

## 2021-12-31 LAB
SARS-COV-2: DETECTED
SOURCE: ABNORMAL

## 2022-01-02 ENCOUNTER — CARE COORDINATION (OUTPATIENT)
Dept: CARE COORDINATION | Age: 32
End: 2022-01-02

## 2022-01-02 NOTE — CARE COORDINATION
Attempted to contact patient for 2 day follow up post ED COVID monitoring. Unable to reach patient by phone. Unable to leave a message  I will attempt follow up call tomorrow.

## 2022-01-03 ENCOUNTER — CARE COORDINATION (OUTPATIENT)
Dept: CARE COORDINATION | Age: 32
End: 2022-01-03

## 2022-03-26 ENCOUNTER — OFFICE VISIT (OUTPATIENT)
Dept: FAMILY MEDICINE CLINIC | Age: 32
End: 2022-03-26
Payer: COMMERCIAL

## 2022-03-26 VITALS
HEART RATE: 97 BPM | TEMPERATURE: 98.4 F | WEIGHT: 192.4 LBS | BODY MASS INDEX: 32.85 KG/M2 | DIASTOLIC BLOOD PRESSURE: 82 MMHG | HEIGHT: 64 IN | OXYGEN SATURATION: 98 % | SYSTOLIC BLOOD PRESSURE: 126 MMHG

## 2022-03-26 DIAGNOSIS — R05.9 COUGH: ICD-10-CM

## 2022-03-26 DIAGNOSIS — B34.9 VIRAL ILLNESS: Primary | ICD-10-CM

## 2022-03-26 LAB
INFLUENZA A ANTIBODY: NORMAL
INFLUENZA B ANTIBODY: NORMAL

## 2022-03-26 PROCEDURE — 99213 OFFICE O/P EST LOW 20 MIN: CPT | Performed by: PHYSICIAN ASSISTANT

## 2022-03-26 PROCEDURE — 87804 INFLUENZA ASSAY W/OPTIC: CPT | Performed by: PHYSICIAN ASSISTANT

## 2022-03-26 RX ORDER — ECHINACEA PURPUREA EXTRACT 125 MG
1 TABLET ORAL PRN
Qty: 1 EACH | Refills: 3 | Status: SHIPPED | OUTPATIENT
Start: 2022-03-26

## 2022-03-26 RX ORDER — GUAIFENESIN AND DEXTROMETHORPHAN HYDROBROMIDE 600; 30 MG/1; MG/1
1 TABLET, EXTENDED RELEASE ORAL 2 TIMES DAILY
Qty: 10 TABLET | Refills: 0 | Status: SHIPPED | OUTPATIENT
Start: 2022-03-26 | End: 2022-03-31

## 2022-03-26 ASSESSMENT — ENCOUNTER SYMPTOMS
COUGH: 1
SORE THROAT: 1
EYES NEGATIVE: 1
GASTROINTESTINAL NEGATIVE: 1

## 2022-03-26 NOTE — PROGRESS NOTES
[id number] [facility] PRIMARY CARE  [address] [place] [address]  Dept: [phone]: [phone]     Pt Name: Edgar Cisse  MRN: [id number]  Jessicagfkathryn 1990      HISTORY OF PRESENT ILLNESS    Edgar Cisse is a 33 y.o. male who presents to the [facility] with cough and congestion for 5 days. First two days of fever with cough. Patient denies shortness of breath. No NVD. No loss in smell or taste. Patient had covid in December 31st. Taking advil for fever along with dayquil and nyquil. Patient states he has started to feel better, but wanted to get checked out. He is here with all of his family members. REVIEW OF SYSTEMS       Review of Systems   Constitutional: Positive for fever. HENT: Positive for congestion, sneezing and sore throat. Eyes: Negative. Respiratory: Positive for cough. Cardiovascular: Negative. Gastrointestinal: Negative. Endocrine: Negative. Genitourinary: Negative. Musculoskeletal: Negative. Skin: Negative. Neurological: Negative. Psychiatric/Behavioral: Negative.           PAST MEDICAL HISTORY     Past Medical History:   Diagnosis Date    Allergic rhinitis     Headache     Obesity 4/27/2021         SURGICAL HISTORY       Past Surgical History:   Procedure Laterality Date    EYE SURGERY  2004    Lense put in lt eye          CURRENT MEDICATIONS       Current Outpatient Medications   Medication Sig Dispense Refill    sodium chloride (ALTAMIST SPRAY) 0.65 % nasal spray 1 spray by Nasal route as needed for Congestion 1 each 3    Dextromethorphan-guaiFENesin (MUCINEX DM)  MG TB12 Take 1 tablet by mouth in the morning and at bedtime for 5 days 10 tablet 0    brompheniramine-pseudoephedrine-DM 2-30-10 MG/5ML syrup Take 5 mLs by mouth 4 times daily as needed for Congestion or Cough 180 mL 0    azelastine (ASTELIN) 0.1 % nasal spray 1 spray by Nasal route 2 times daily Use in each nostril as directed 1 Bottle 0     No current facility-administered medications for this visit. ALLERGIES     Patient has no known allergies. FAMILY HISTORY       Family History   Problem Relation Age of Onset    Depression Mother     High Blood Pressure Father     Heart Disease Father     Alcohol Abuse Father     Asthma Sister     Allergy (Severe) Sister     Diabetes Maternal Aunt     Diabetes Maternal Grandfather           SOCIAL HISTORY       Social History     Socioeconomic History    Marital status:      Spouse name: Jason Mendoza    Number of children: 3    Years of education: 15    Highest education level: Some college, no degree   Occupational History    Occupation: [employer]   Tobacco Use    Smoking status: Never Smoker    Smokeless tobacco: Never Used   Vaping Use    Vaping Use: Never used   Substance and Sexual Activity    Alcohol use: Never    Drug use: Never    Sexual activity: Yes   Other Topics Concern    Not on file   Social History Narrative    Not on file     Social Determinants of Health     Financial Resource Strain: Low Risk     Difficulty of Paying Living Expenses: Not hard at all   Food Insecurity: No Food Insecurity    Worried About Running Out of Food in the Last Year: Never true    [address] in the Last Year: Never true   Transportation Needs: No Transportation Needs    Lack of Transportation (Medical): No    Lack of Transportation (Non-Medical):  No   Physical Activity:     Days of Exercise per Week: Not on file    Minutes of Exercise per Session: Not on file   Stress:     Feeling of Stress : Not on file   Social Connections:     Frequency of Communication with Friends and Family: Not on file    Frequency of Social Gatherings with Friends and Family: Not on file    Attends [employer] Services: Not on file    Active Member of Clubs or Organizations: Not on file    Attends Club or Organization Meetings: Not on file    Marital Status: Not on file   Intimate Partner Violence:     Fear of Current or Ex-Partner: Not on file    Emotionally Abused: Not on file    Physically Abused: Not on file    Sexually Abused: Not on file   Housing Stability:     Unable to Pay for Housing in the Last Year: Not on file    Number of [place] in the Last Year: Not on file    Unstable Housing in the Last Year: Not on file         PHYSICAL EXAM    (up to 7 for level 4, 8 or more for level 5)       Physical Exam  Constitutional:       General: He is not in acute distress. Appearance: He is well-developed. HENT:      Head: Normocephalic and atraumatic. Nose: Congestion and rhinorrhea present. Eyes:      Conjunctiva/sclera: Conjunctivae normal.      Pupils: Pupils are equal, round, and reactive to light. Cardiovascular:      Rate and Rhythm: Normal rate and regular rhythm. Heart sounds: No murmur heard. Pulmonary:      Effort: No respiratory distress. Breath sounds: Normal breath sounds. No wheezing or rales. Abdominal:      General: There is no distension. Palpations: Abdomen is soft. Tenderness: There is no abdominal tenderness. Musculoskeletal:         General: Normal range of motion. Cervical back: Normal range of motion and neck supple. Skin:     General: Skin is warm and dry. Findings: No erythema or rash. Neurological:      Mental Status: He is alert and oriented to person, place, and time. Cranial Nerves: No cranial nerve deficit. Psychiatric:         Judgment: Judgment normal.           All other labs were within normal range or not returned as of this dictation. Vitals: There were no vitals filed for this visit. Rapid influenza test is negative. Patient symptoms are improving from initial onset. He will be placed on Mucinex DM and nasal saline spray for treatment at home. Follow-up with primary care provider for reevaluation and treatment as needed.   He verbalizes understanding of plan and discharge has no further questions. DISPOSITION/PLAN   1. Cough  2.  Viral illness    - POCT Influenza A/B

## 2022-03-28 NOTE — PATIENT INSTRUCTIONS
Patient Education        Viral Infections: Care Instructions  Overview     You don't feel well, but it's not clear what's causing it. You may have a viral infection. Viruses cause many illnesses, such as the common cold, influenza, fever, rashes, and the diarrhea, nausea, and vomiting that are symptoms of a stomach infection. You may wonder if antibiotic medicines could make you feel better. But antibiotics only treat infections caused by bacteria. They don'twork on viruses. The good news is that viral infections usually aren't serious. Most will go away in a few days without medical treatment. In the meantime, there are a fewthings you can do to make yourself more comfortable. Follow-up care is a key part of your treatment and safety. Be sure to make and go to all appointments, and call your doctor if you are having problems. It's also a good idea to know your test results and keep alist of the medicines you take. How can you care for yourself at home?  Get plenty of rest if you feel tired.  Take an over-the-counter pain medicine if needed, such as acetaminophen (Tylenol), ibuprofen (Advil, Motrin), or naproxen (Aleve). Read and follow all instructions on the label.  Be careful when taking over-the-counter cold or flu medicines and Tylenol at the same time. Many of these medicines have acetaminophen, which is Tylenol. Read the labels to make sure that you are not taking more than the recommended dose. Too much acetaminophen (Tylenol) can be harmful.  Drink plenty of fluids. If you have kidney, heart, or liver disease and have to limit fluids, talk with your doctor before you increase the amount of fluids you drink.  Stay home from work, school, and other public places while you have a fever. When should you call for help? Call 911 anytime you think you may need emergency care. For example, call if:     You have severe trouble breathing.      You passed out (lost consciousness).    Call your doctor now or seek immediate medical care if:     You seem to be getting much sicker.      You have a new or higher fever.      You have blood in your stools.      You have new belly pain, or your pain gets worse.      You have a new rash. Watch closely for changes in your health, and be sure to contact your doctor if:     You start to get better and then get worse.      You do not get better as expected. Where can you learn more? Go to [email or web address] and sign in to your [employer] account. Enter P582 in the [employer] box to learn more about \"Viral Infections: Care Instructions. \"     If you do not have an account, please click on the \"Sign Up Now\" link. Current as of: July 1, 2021               Content Version: 13.2  © 3825-4000 [employer]. Care instructions adapted under license by [facility] ([facility]). If you have questions about a medical condition or this instruction, always ask your healthcare professional. [place] [address] any warranty or liability for your use of this information.